# Patient Record
Sex: MALE | Race: WHITE | NOT HISPANIC OR LATINO | Employment: FULL TIME | URBAN - METROPOLITAN AREA
[De-identification: names, ages, dates, MRNs, and addresses within clinical notes are randomized per-mention and may not be internally consistent; named-entity substitution may affect disease eponyms.]

---

## 2023-05-23 ENCOUNTER — APPOINTMENT (OUTPATIENT)
Dept: CT IMAGING | Facility: HOSPITAL | Age: 53
End: 2023-05-23
Payer: COMMERCIAL

## 2023-05-23 ENCOUNTER — HOSPITAL ENCOUNTER (EMERGENCY)
Facility: HOSPITAL | Age: 53
Discharge: HOME | End: 2023-05-23
Attending: EMERGENCY MEDICINE
Payer: COMMERCIAL

## 2023-05-23 ENCOUNTER — APPOINTMENT (OUTPATIENT)
Dept: GENERAL RADIOLOGY | Facility: HOSPITAL | Age: 53
End: 2023-05-23
Payer: COMMERCIAL

## 2023-05-23 VITALS
OXYGEN SATURATION: 98 % | DIASTOLIC BLOOD PRESSURE: 107 MMHG | SYSTOLIC BLOOD PRESSURE: 165 MMHG | TEMPERATURE: 97.7 F | RESPIRATION RATE: 18 BRPM | HEART RATE: 78 BPM

## 2023-05-23 DIAGNOSIS — J03.90 TONSILLITIS: Primary | ICD-10-CM

## 2023-05-23 LAB
ALBUMIN SERPL-MCNC: 4 G/DL (ref 3.5–5.2)
ALBUMIN/GLOB SERPL: 1.5 G/DL
ALP SERPL-CCNC: 75 U/L (ref 39–117)
ALT SERPL W P-5'-P-CCNC: 29 U/L (ref 1–41)
ANION GAP SERPL CALCULATED.3IONS-SCNC: 8.3 MMOL/L (ref 5–15)
AST SERPL-CCNC: 19 U/L (ref 1–40)
BASOPHILS # BLD AUTO: 0.05 10*3/MM3 (ref 0–0.2)
BASOPHILS NFR BLD AUTO: 0.4 % (ref 0–1.5)
BILIRUB SERPL-MCNC: 0.2 MG/DL (ref 0–1.2)
BUN SERPL-MCNC: 11 MG/DL (ref 6–20)
BUN/CREAT SERPL: 11.1 (ref 7–25)
CALCIUM SPEC-SCNC: 9.2 MG/DL (ref 8.6–10.5)
CHLORIDE SERPL-SCNC: 98 MMOL/L (ref 98–107)
CO2 SERPL-SCNC: 29.7 MMOL/L (ref 22–29)
CREAT SERPL-MCNC: 0.99 MG/DL (ref 0.76–1.27)
DEPRECATED RDW RBC AUTO: 41.2 FL (ref 37–54)
EGFRCR SERPLBLD CKD-EPI 2021: 91.1 ML/MIN/1.73
EOSINOPHIL # BLD AUTO: 0.1 10*3/MM3 (ref 0–0.4)
EOSINOPHIL NFR BLD AUTO: 0.9 % (ref 0.3–6.2)
ERYTHROCYTE [DISTWIDTH] IN BLOOD BY AUTOMATED COUNT: 12.4 % (ref 12.3–15.4)
GLOBULIN UR ELPH-MCNC: 2.7 GM/DL
GLUCOSE SERPL-MCNC: 93 MG/DL (ref 65–99)
HCT VFR BLD AUTO: 47.5 % (ref 37.5–51)
HGB BLD-MCNC: 15.8 G/DL (ref 13–17.7)
IMM GRANULOCYTES # BLD AUTO: 0.04 10*3/MM3 (ref 0–0.05)
IMM GRANULOCYTES NFR BLD AUTO: 0.3 % (ref 0–0.5)
LYMPHOCYTES # BLD AUTO: 3.66 10*3/MM3 (ref 0.7–3.1)
LYMPHOCYTES NFR BLD AUTO: 31.7 % (ref 19.6–45.3)
MCH RBC QN AUTO: 30 PG (ref 26.6–33)
MCHC RBC AUTO-ENTMCNC: 33.3 G/DL (ref 31.5–35.7)
MCV RBC AUTO: 90.1 FL (ref 79–97)
MONOCYTES # BLD AUTO: 1.03 10*3/MM3 (ref 0.1–0.9)
MONOCYTES NFR BLD AUTO: 8.9 % (ref 5–12)
NEUTROPHILS NFR BLD AUTO: 57.8 % (ref 42.7–76)
NEUTROPHILS NFR BLD AUTO: 6.66 10*3/MM3 (ref 1.7–7)
PLATELET # BLD AUTO: 309 10*3/MM3 (ref 140–450)
PMV BLD AUTO: 9.8 FL (ref 6–12)
POTASSIUM SERPL-SCNC: 3.6 MMOL/L (ref 3.5–5.2)
PROT SERPL-MCNC: 6.7 G/DL (ref 6–8.5)
RBC # BLD AUTO: 5.27 10*6/MM3 (ref 4.14–5.8)
SODIUM SERPL-SCNC: 136 MMOL/L (ref 136–145)
STREP A PCR: NOT DETECTED
WBC NRBC COR # BLD: 11.54 10*3/MM3 (ref 3.4–10.8)

## 2023-05-23 PROCEDURE — 70491 CT SOFT TISSUE NECK W/DYE: CPT

## 2023-05-23 PROCEDURE — 87651 STREP A DNA AMP PROBE: CPT | Performed by: NURSE PRACTITIONER

## 2023-05-23 PROCEDURE — 71045 X-RAY EXAM CHEST 1 VIEW: CPT

## 2023-05-23 PROCEDURE — 80053 COMPREHEN METABOLIC PANEL: CPT | Performed by: NURSE PRACTITIONER

## 2023-05-23 PROCEDURE — 85025 COMPLETE CBC W/AUTO DIFF WBC: CPT | Performed by: NURSE PRACTITIONER

## 2023-05-23 PROCEDURE — 25510000001 IOPAMIDOL PER 1 ML: Performed by: EMERGENCY MEDICINE

## 2023-05-23 PROCEDURE — 99283 EMERGENCY DEPT VISIT LOW MDM: CPT

## 2023-05-23 RX ORDER — AMOXICILLIN 500 MG/1
500 CAPSULE ORAL 2 TIMES DAILY
Qty: 20 CAPSULE | Refills: 0 | Status: SHIPPED | OUTPATIENT
Start: 2023-05-23 | End: 2023-06-02

## 2023-05-23 RX ADMIN — IOPAMIDOL 100 ML: 755 INJECTION, SOLUTION INTRAVENOUS at 11:41

## 2023-05-23 NOTE — FS[facility] PROVIDER NOTE
EMERGENCY DEPARTMENT ENCOUNTER    Room Number:    Date seen:  2023  Time seen: 11:04 EDT  PCP: Andrea Redding MD  Historian:     HPI:  Chief complaint: Sore throat, cough  Context:Barbara Vaughn is a 53 y.o. male who presents to the ED with c/o sore throat, cough.  Patient reports he had a slight fever last night however he took some aspirin and that kept the fever down.  Patient reports he was seen here approximately 1 to 2 weeks ago with same complaint was given steroids and cough syrup reports that he would get some relief from coughing with the cough syrup however he is still feeling as rough as he was in the onset of his symptoms.    Timin weeks  Duration: Constant  Location: Sore throat  Radiation: Nonradiating  Quality: Sharp burning pain in throat  Intensity/Severity: 5 out of 10  Associated Symptoms: Pain with swallowing and speaking  Aggravating Factors: Swallowing and speaking  Alleviating Factors: Cough syrup  Previous Episodes: Onset 2 weeks ago  Treatment before arrival: Steroids and prescription cough syrup    The patient was placed in a mask in triage, hand hygiene was performed before and after my interaction with the patient.  I wore a mask, safety glasses and gloves during my entire interaction with the patient.    MEDICAL RECORD REVIEW  Yes    ALLERGIES  Patient has no known allergies.    PAST MEDICAL HISTORY  Active Ambulatory Problems     Diagnosis Date Noted   • No Active Ambulatory Problems     Resolved Ambulatory Problems     Diagnosis Date Noted   • No Resolved Ambulatory Problems     No Additional Past Medical History       PAST SURGICAL HISTORY  No past surgical history on file.    FAMILY HISTORY  No family history on file.    SOCIAL HISTORY  Social History     Socioeconomic History   • Marital status: Single       REVIEW OF SYSTEMS  Review of Systems   Constitutional: Positive for fever. Negative for activity change, appetite change, chills, diaphoresis and  unexpected weight change.   HENT: Positive for sore throat. Negative for congestion, postnasal drip, rhinorrhea and sinus pain.    Respiratory: Positive for cough (Productive cough with white to yellow phlegm). Negative for choking, chest tightness, shortness of breath, wheezing and stridor.    Cardiovascular: Negative.    Gastrointestinal: Negative.    Genitourinary: Negative.    Skin: Negative.        All systems reviewed and negative except for those discussed in HPI.     PHYSICAL EXAM    I have reviewed the triage vital signs and nursing notes.    ED Triage Vitals [05/23/23 1003]   Temp Heart Rate Resp BP SpO2   97.7 °F (36.5 °C) 78 18 (!) 165/107 98 %      Temp src Heart Rate Source Patient Position BP Location FiO2 (%)   Oral Monitor -- -- --       Physical Exam  Constitutional:       General: He is not in acute distress.     Appearance: He is well-developed. He is not ill-appearing, toxic-appearing or diaphoretic.   HENT:      Head: Normocephalic.      Mouth/Throat:      Mouth: Mucous membranes are moist.      Pharynx: Uvula midline. Pharyngeal swelling and posterior oropharyngeal erythema present.      Tonsils: No tonsillar exudate. 2+ on the right. 2+ on the left.   Eyes:      Conjunctiva/sclera: Conjunctivae normal.   Neck:      Thyroid: No thyromegaly.   Cardiovascular:      Rate and Rhythm: Normal rate and regular rhythm.      Heart sounds: Normal heart sounds. No murmur heard.    No friction rub. No gallop.   Pulmonary:      Effort: Pulmonary effort is normal.      Breath sounds: Normal breath sounds.   Abdominal:      General: Bowel sounds are normal.      Palpations: Abdomen is soft.   Musculoskeletal:      Cervical back: Normal range of motion and neck supple.   Lymphadenopathy:      Cervical: Cervical adenopathy present.   Skin:     General: Skin is warm and dry.      Capillary Refill: Capillary refill takes less than 2 seconds.   Neurological:      General: No focal deficit present.      Mental  Status: He is alert and oriented to person, place, and time.   Psychiatric:         Mood and Affect: Mood normal.         Behavior: Behavior normal.         Vital signs and nursing notes reviewed.        LAB RESULTS  Recent Results (from the past 24 hour(s))   Rapid Strep A Screen - Swab, Throat    Collection Time: 05/23/23 10:46 AM    Specimen: Throat; Swab   Result Value Ref Range    STREP A PCR Not Detected Not Detected       Ordered the above labs and independently reviewed the results.      RADIOLOGY RESULTS  XR Chest 1 View    Result Date: 5/23/2023  XR CHEST 1 VW Date of Exam: 5/23/2023 10:08 AM EDT Indication: Cough Comparison: None available. Findings: No acute airspace disease. Normal heart size. No pleural effusion, pneumothorax or acute osseous abnormality     Impression: No acute chest finding. Electronically Signed: Vanessa Melgar  5/23/2023 10:15 AM EDT  Workstation ID: [id number]         I ordered the above noted radiological studies. Independently reviewed by me and discussed with radiologist.  See dictation above for official radiology interpretation.      Orders placed during this visit:  Orders Placed This Encounter   Procedures   • Rapid Strep A Screen - Swab, Throat   • XR Chest 1 View   • CT Soft Tissue Neck With Contrast   • Comprehensive Metabolic Panel   • CBC Auto Differential   • CBC & Differential   • ED Acknowledgement Form Needed;              Medical Decision Making  Patient reports he had slight relief after taking the cough syrup but it would only last for an hour or 2.  States he has taken the full round of steroids and however the swelling in his throat is worsened.  We will retest patient for strep as he has pharyngeal erythema, tonsillar hypertrophy, cervical lymphadenopathy and reports fever last night.    Strep swab is negative, discussed with patient of performing CT scan to rule out tonsillar abscess, patient is agreeable, will get labs and CT scan soft tissue of the  neck.    Labs reviewed by me, labs are unremarkable except for WBC 11.5  CT soft tissue neck as interpreted by radiology  IMPRESSION:  Impression:  1.[place] tonsils are prominent in size. No definite [place] tonsillar abscess is confirmed.  2.Slightly prominent level 2A lymph node on the right that could be reactive in nature.  3.Mucous retention cyst or polyp right maxillary sinus.     We will treat patient for tonsillitis with amoxicillin 500 twice daily for 10 days, discussed option of doing IM penicillin patient opted for the oral route.  Patient given symptomatic care instructions.  Patient is agreeable plan of care, all questions answered at this time    Amount and/or Complexity of Data Reviewed  Labs: ordered.  Radiology: ordered.      Risk  Prescription drug management.          PROCEDURES    Procedures        MEDICATIONS GIVEN IN ER    Medications - No data to display      PROGRESS, DATA ANALYSIS, CONSULTS, AND MEDICAL DECISION MAKING    All labs have been independently reviewed by me.  All radiology studies have been reviewed by me.   EKG's independently reviewed by me.  Discussion below represents my analysis of pertinent findings related to patient's condition, differential diagnosis, treatment plan and final disposition.    DIFFERENTIAL DIAGNOSIS INCLUDE BUT NOT LIMITED TO: Strep throat, tonsillar abscess, pharyngitis         AS OF 11:18 EDT VITALS:    BP - (!) 165/107  HR - 78  TEMP - 97.7 °F (36.5 °C) (Oral)  02 SATS - 98%    I rechecked the patient.  I discussed the patient's labs, radiology findings (including all incidental findings), diagnosis, and plan for discharge.  A repeat exam reveals no new worrisome changes from my initial exam findings.  The patient understands that the fact that they are being discharged does not denote that nothing is abnormal, it indicates that no clinical emergency is present and that they must follow-up as directed in order to properly maintain their health.   Follow-up instructions (specifically listed below) and return to ER precautions were given at this time.  I specifically instructed the patient to follow-up with their PCP.  The patient understands and agrees with the plan, and is ready for discharge.  All questions answered.    Critical care:  Total critical care time of 0 minutes is exclusive of any other billable procedures and includes time spent with direct patient care and observation, retrospective chart review, management of acute condition, and consultation with other medical providers.    DIAGNOSIS  Final diagnoses:   None         DISPOSITION  Discharge home    Pt masked in first look. I wore a surgical mask throughout my encounters with the pt. I performed hand hygiene on entry into the pt room and upon exit.     Dictated utilizing Dragon dictation     Note Disclaimer: At [facility], we believe that sharing information builds trust and better relationships. You are receiving this note because you recently visited [facility]. It is possible you will see health information before a provider has talked with you about it. This kind of information can be easy to misunderstand. To help you fully understand what it means for your health, we urge you to discuss this note with your provider.

## 2023-05-23 NOTE — Clinical Note
[facility] [place] [id number] [place] [address] [place] [address]  Phone: [phone]    Barbara Vaughn was seen and treated in our emergency department on 5/23/2023.  He may return to work on 05/24/2023.         Thank you for choosing [facility].    Bimal Núñez MD

## 2023-05-23 NOTE — DISCHARGE INSTRUCTIONS
I sent a prescription for amoxicillin to your pharmacy recommend taking this as prescribed twice a day for the next 10 days.  Recommend following up with your primary care provider next 5 days for reevaluation.  Continue doing your salt water gargles and throat lozenges to help with irritation.  Be sure to stay well-hydrated drinking plenty of fluids.  May also try warm tea with honey to help soothe your irritated throat.  Return to the emergency department for worsening symptoms including increased swelling, difficulty breathing or swallowing or other concerns

## 2024-03-18 ENCOUNTER — HOSPITAL ENCOUNTER (OUTPATIENT)
Facility: HOSPITAL | Age: 54
Discharge: HOME | End: 2024-03-18
Attending: EMERGENCY MEDICINE | Admitting: EMERGENCY MEDICINE
Payer: MEDICAID

## 2024-03-18 VITALS
SYSTOLIC BLOOD PRESSURE: 142 MMHG | HEART RATE: 80 BPM | BODY MASS INDEX: 30.49 KG/M2 | TEMPERATURE: 97.9 F | OXYGEN SATURATION: 99 % | DIASTOLIC BLOOD PRESSURE: 80 MMHG | HEIGHT: 71 IN | WEIGHT: 217.8 LBS | RESPIRATION RATE: 22 BRPM

## 2024-03-18 DIAGNOSIS — K02.9 DENTAL CARIES: ICD-10-CM

## 2024-03-18 DIAGNOSIS — B34.9 VIRAL SYNDROME: Primary | ICD-10-CM

## 2024-03-18 LAB
FLUAV SUBTYP SPEC NAA+PROBE: NOT DETECTED
FLUBV RNA ISLT QL NAA+PROBE: NOT DETECTED
SARS-COV-2 RNA RESP QL NAA+PROBE: NOT DETECTED

## 2024-03-18 PROCEDURE — 87636 SARSCOV2 & INF A&B AMP PRB: CPT | Performed by: EMERGENCY MEDICINE

## 2024-03-18 PROCEDURE — G0463 HOSPITAL OUTPT CLINIC VISIT: HCPCS

## 2024-03-18 NOTE — FSED PROVIDER NOTE
Subjective   History of Present Illness  Chief Complaint: Congestion, Dental infection      HPI: Patient is a 54-year-old male who presents by private vehicle states he is pending multiple teeth extractions and is currently on antibiotics.  Over the last 24 hours he has had increasing congestion with runny nose, body aches he had 1 episode of vomiting yesterday.  He has had no associated chest pain or shortness of breath.  He has had difficulty having the teeth pulled as he states each time he sees the dentist they tell him that his blood pressure is too high.  He is scheduled to see a new PCP the 27th of this month.    PCP: Vahid    History provided by:  Patient      Review of Systems   Constitutional:  Positive for diaphoresis. Negative for fever.   HENT:  Positive for congestion and rhinorrhea. Negative for sore throat.    Respiratory:  Negative for shortness of breath.    Cardiovascular:  Negative for chest pain.   Gastrointestinal:  Positive for nausea.       Past Medical History:   Diagnosis Date    GERD (gastroesophageal reflux disease)     Injury of back        No Known Allergies    No past surgical history on file.    No family history on file.    Social History     Socioeconomic History    Marital status: Single           Objective   Physical Exam  Vitals reviewed.   HENT:      Head: Normocephalic.      Right Ear: Tympanic membrane and ear canal normal.      Left Ear: Tympanic membrane and ear canal normal.      Nose: Congestion present.      Mouth/Throat:      Dentition: Gingival swelling and dental caries present. No dental abscesses.      Pharynx: Oropharynx is clear. Uvula midline. No pharyngeal swelling.      Tonsils: No tonsillar exudate or tonsillar abscesses.   Cardiovascular:      Rate and Rhythm: Normal rate.      Pulses: Normal pulses.      Heart sounds: Normal heart sounds.   Pulmonary:      Effort: Pulmonary effort is normal.      Breath sounds: Normal breath sounds. No wheezing.  "  Neurological:      Mental Status: He is alert.         Procedures           ED Course      /80 (BP Location: Right arm, Patient Position: Sitting)   Pulse 80   Temp 97.9 °F (36.6 °C) (Oral)   Resp 22   Ht 180.3 cm (71\")   Wt 98.8 kg (217 lb 12.8 oz)   SpO2 99%   BMI 30.38 kg/m²   Labs Reviewed   COVID-19 AND FLU A/B, NP SWAB IN TRANSPORT MEDIA 1 HR TAT - Normal    Narrative:     Fact sheet for providers: https://www.fda.gov/media/874630/download    Fact sheet for patients: https://www.fda.gov/media/562042/download    Test performed by PCR.     Medications - No data to display  No radiology results for the last day                                       Medical Decision Making  Patient presented with above complaints, noted physical exam completed.  Patient afebrile, in no acute distress.  Currently on antibiotics for the treatment of dental infections pending tooth extraction.  Blood pressure within normal limits.  He has had no associated chest pain, dizziness or shortness of breath.  COVID and influenza testing was negative.  Discussed other viral syndrome.  Discussed monitoring blood pressures at home and keeping log to take to PCP appointment that is scheduled for next week.  I did advise to continue the antibiotics until completion and attempt to get an earlier PCP appointment if possible or touch base with the dentist regarding normotensive state.  We discussed over-the-counter treatment of his symptoms as well as worrisome symptoms to monitor for and when to return to our facility.  Patient gave verbal understanding of plan of care.  He denied further questions or complaints.  No acute distress at time of discharge.      Chart review: 7/26/2023 outpatient visit at [facility] related to foot pain.      Note Disclaimer: At [facility], we believe that sharing information builds trust and better  relationships. You are receiving this note because you recently visited [facility]. It is " possible you will see health information before a provider has talked with you about it. This kind of information can be easy to misunderstand. To help you fully understand what it means for your health, we urge you to discuss this note with your provider.       Part of this note may be an electronic transcription/translation of spoken language to printed text using the Dragon Dictation System.    Appropriate PPE worn during exam.    Problems Addressed:  Dental caries: acute illness or injury  Viral syndrome: acute illness or injury        Final diagnoses:   Viral syndrome   Dental caries       ED Disposition  ED Disposition       ED Disposition   Discharge    Condition   Stable    Comment   --               Andrea Redding MD  [address]  [address] [place] [id number] [phone]               Medication List      No changes were made to your prescriptions during this visit.

## 2024-03-18 NOTE — DISCHARGE INSTRUCTIONS
Monitor blood pressures as discussed, call PCP and attempt to get earlier appointment.    Over-the-counter cold and flu medications  Continue antibiotics until completion    Return for new or worsening symptoms

## 2024-03-18 NOTE — Clinical Note
[facility] [place] [address] [place] [address] [place] [address]  Phone: [phone]    Barbara Vaughn was seen and treated in our emergency department on 3/18/2024.  He may return to work on 03/19/2024.         Thank you for choosing [facility].    Shereen Chavez APRN